# Patient Record
(demographics unavailable — no encounter records)

---

## 2024-11-07 NOTE — PHYSICAL EXAM
[Alert] : alert [No Acute Distress] : no acute distress [Normocephalic] : normocephalic [EOMI Bilateral] : EOMI bilateral [Clear tympanic membranes with bony landmarks and light reflex present bilaterally] : clear tympanic membranes with bony landmarks and light reflex present bilaterally  [Pink Nasal Mucosa] : pink nasal mucosa [Nonerythematous Oropharynx] : nonerythematous oropharynx [Supple, full passive range of motion] : supple, full passive range of motion [No Palpable Masses] : no palpable masses [Clear to Auscultation Bilaterally] : clear to auscultation bilaterally [Regular Rate and Rhythm] : regular rate and rhythm [Normal S1, S2 audible] : normal S1, S2 audible [No Murmurs] : no murmurs [+2 Femoral Pulses] : +2 femoral pulses [Soft] : soft [NonTender] : non tender [Non Distended] : non distended [Normoactive Bowel Sounds] : normoactive bowel sounds [No Hepatomegaly] : no hepatomegaly [No Splenomegaly] : no splenomegaly [Elier: ____] : Elier [unfilled] [No Masses] : no masses [Elier: _____] : Elier [unfilled] [Normal External Genitalia] : normal external genitalia [No Abnormal Lymph Nodes Palpated] : no abnormal lymph nodes palpated [Normal Muscle Tone] : normal muscle tone [No Gait Asymmetry] : no gait asymmetry [No pain or deformities with palpation of bone, muscles, joints] : no pain or deformities with palpation of bone, muscles, joints [Straight] : straight [+2 Patella DTR] : +2 patella DTR [Cranial Nerves Grossly Intact] : cranial nerves grossly intact [No Rash or Lesions] : no rash or lesions [de-identified] : short stature

## 2024-11-07 NOTE — HISTORY OF PRESENT ILLNESS
[Fruit] : fruit [Vegetables] : vegetables [Meat] : meat [Grains] : grains [Eggs] : eggs [Fish] : fish [Dairy] : dairy [Eats healthy meals and snacks] : eats healthy meals and snacks [___ stools every other day] : [unfilled]  stools every other day [Firm] : stools are firm consistency [___ voids per day] : [unfilled] voids per day [Normal] : Normal [In own bed] : In own bed [Brushing teeth twice/d] : brushing teeth twice per day [Flossing teeth] : flossing teeth [Playtime (60 min/d)] : playtime 60 min a day [Participates in after-school activities] : participates in after-school activities [< 2 hrs of screen time per day] : less than 2 hrs of screen time per day [Appropiate parent-child-sibling interaction] : appropriate parent-child-sibling interaction [Does chores when asked] : does chores when asked [Has Friends] : has friends [Has chance to make own decisions] : has chance to make own decisions [Grade ___] : Grade [unfilled] [Adequate social interactions] : adequate social interactions [Adequate behavior] : adequate behavior [Adequate performance] : adequate performance [Adequate attention] : adequate attention [No difficulties with Homework] : no difficulties with homework [Appropriately restrained in motor vehicle] : appropriately restrained in motor vehicle [Supervised outdoor play] : supervised outdoor play [Supervised around water] : supervised around water [Wears helmet and pads] : wears helmet and pads [Parent knows child's friends] : parent knows child's friends [Parent discusses safety rules regarding adults] : parent discusses safety rules regarding adults [Family discusses home emergency plan] : family discusses home emergency plan [Monitored computer use] : monitored computer use [Sleeps ___ hours per night] : sleeps [unfilled] hours per night [Father] : father [Yes] : Patient goes to dentist yearly [Toothpaste] : Primary Fluoride Source: Toothpaste [Up to date] : Up to date [Eats meals with family] : eats meals with family [Has family members/adults to turn to for help] : has family members/adults to turn to for help [Is permitted and is able to make independent decisions] : Is permitted and is able to make independent decisions [Sleep Concerns] : sleep concerns [Grade: ____] : Grade: [unfilled] [Normal Performance] : normal performance [Normal Behavior/Attention] : normal behavior/attention [Normal Homework] : normal homework [Eats regular meals including adequate fruits and vegetables] : eats regular meals including adequate fruits and vegetables [Drinks non-sweetened liquids] : drinks non-sweetened liquids  [Calcium source] : calcium source [Has friends] : has friends [At least 1 hour of physical activity a day] : at least 1 hour of physical activity a day [Screen time (except homework) less than 2 hours a day] : screen time (except homework) less than 2 hours a day [Has interests/participates in community activities/volunteers] : has interests/participates in community activities/volunteers. [Uses safety belts/safety equipment] : uses safety belts/safety equipment  [Has peer relationships free of violence] : has peer relationships free of violence [No] : Patient has not had sexual intercourse [HIV Screening Declined] : HIV Screening Declined [Has ways to cope with stress] : has ways to cope with stress [Displays self-confidence] : displays self-confidence [Has problems with sleep] : has problems with sleep [Gets depressed, anxious, or irritable/has mood swings] : gets depressed, anxious, or irritable/has mood swings [With Teen] : teen [With Parent/Guardian] : parent/guardian [NO] : No [Exposure to illicit drugs] : no exposure to illicit drugs [de-identified] : eating better this year as per dad, having 3 meals per day  [FreeTextEntry3] : has a difficult time falling asleep, giving 1/2 tablet of 27mg concerta daily which has helped her to go to sleep, had a break during the summer  [Has concerns about body or appearance] : does not have concerns about body or appearance [Uses electronic nicotine delivery system] : does not use electronic nicotine delivery system [Exposure to electronic nicotine delivery system] : no exposure to electronic nicotine delivery system [Uses tobacco] : does not use tobacco [Exposure to tobacco] : no exposure to tobacco [Uses drugs] : does not use drugs  [Exposure to drugs] : no exposure to drugs [Drinks alcohol] : does not drink alcohol [Exposure to alcohol] : no exposure to alcohol [Impaired/distracted driving] : no impaired/distracted driving [Has thought about hurting self or considered suicide] : has not thought about hurting self or considered suicide [FreeTextEntry7] : no recent hospitalizations, ed visits  [de-identified] : none [de-identified] : adequate attention/focus, no homework difficulty  [de-identified] : eating fruits, vegetables, meats, grains. eggs, fish, cheese - has a para that helps with redirecting and her para has reported better eating habits this school year - dad states she has atleast 3 meals per day and they are balanced. Taking daily mulivitamin [FreeTextEntry1] :   Last year there was stress at home with grandparents being sick however they have since improved.

## 2024-11-07 NOTE — PHYSICAL EXAM
[Alert] : alert [No Acute Distress] : no acute distress [Normocephalic] : normocephalic [EOMI Bilateral] : EOMI bilateral [Clear tympanic membranes with bony landmarks and light reflex present bilaterally] : clear tympanic membranes with bony landmarks and light reflex present bilaterally  [Pink Nasal Mucosa] : pink nasal mucosa [Nonerythematous Oropharynx] : nonerythematous oropharynx [Supple, full passive range of motion] : supple, full passive range of motion [No Palpable Masses] : no palpable masses [Clear to Auscultation Bilaterally] : clear to auscultation bilaterally [Regular Rate and Rhythm] : regular rate and rhythm [Normal S1, S2 audible] : normal S1, S2 audible [No Murmurs] : no murmurs [+2 Femoral Pulses] : +2 femoral pulses [Soft] : soft [NonTender] : non tender [Non Distended] : non distended [Normoactive Bowel Sounds] : normoactive bowel sounds [No Hepatomegaly] : no hepatomegaly [No Splenomegaly] : no splenomegaly [Elier: ____] : Elier [unfilled] [No Masses] : no masses [Elier: _____] : Elier [unfilled] [Normal External Genitalia] : normal external genitalia [No Abnormal Lymph Nodes Palpated] : no abnormal lymph nodes palpated [Normal Muscle Tone] : normal muscle tone [No Gait Asymmetry] : no gait asymmetry [No pain or deformities with palpation of bone, muscles, joints] : no pain or deformities with palpation of bone, muscles, joints [Straight] : straight [+2 Patella DTR] : +2 patella DTR [Cranial Nerves Grossly Intact] : cranial nerves grossly intact [No Rash or Lesions] : no rash or lesions [de-identified] : short stature

## 2024-11-07 NOTE — PHYSICAL EXAM
[Alert] : alert [No Acute Distress] : no acute distress [Normocephalic] : normocephalic [EOMI Bilateral] : EOMI bilateral [Clear tympanic membranes with bony landmarks and light reflex present bilaterally] : clear tympanic membranes with bony landmarks and light reflex present bilaterally  [Pink Nasal Mucosa] : pink nasal mucosa [Nonerythematous Oropharynx] : nonerythematous oropharynx [Supple, full passive range of motion] : supple, full passive range of motion [No Palpable Masses] : no palpable masses [Clear to Auscultation Bilaterally] : clear to auscultation bilaterally [Regular Rate and Rhythm] : regular rate and rhythm [Normal S1, S2 audible] : normal S1, S2 audible [No Murmurs] : no murmurs [+2 Femoral Pulses] : +2 femoral pulses [Soft] : soft [NonTender] : non tender [Non Distended] : non distended [Normoactive Bowel Sounds] : normoactive bowel sounds [No Hepatomegaly] : no hepatomegaly [No Splenomegaly] : no splenomegaly [Elier: ____] : Elier [unfilled] [No Masses] : no masses [Elier: _____] : Elier [unfilled] [Normal External Genitalia] : normal external genitalia [No Abnormal Lymph Nodes Palpated] : no abnormal lymph nodes palpated [Normal Muscle Tone] : normal muscle tone [No Gait Asymmetry] : no gait asymmetry [No pain or deformities with palpation of bone, muscles, joints] : no pain or deformities with palpation of bone, muscles, joints [Straight] : straight [+2 Patella DTR] : +2 patella DTR [Cranial Nerves Grossly Intact] : cranial nerves grossly intact [No Rash or Lesions] : no rash or lesions [de-identified] : short stature

## 2024-11-07 NOTE — DISCUSSION/SUMMARY
[Normal Development] : development  [No Elimination Concerns] : elimination [Continue Regimen] : feeding [No Skin Concerns] : skin [Normal Sleep Pattern] : sleep [Poor Weight Gain] : poor weight gain [Poor Height Growth] : poor height growth [None] : no medical problems [Anticipatory Guidance Given] : Anticipatory guidance addressed as per the history of present illness section [Physical Growth and Development] : physical growth and development [Social and Academic Competence] : social and academic competence [Emotional Well-Being] : emotional well-being [Risk Reduction] : risk reduction [Violence and Injury Prevention] : violence and injury prevention [No Medications] : ~He/She~ is not on any medications [Patient] : patient [Parent/Guardian] : Parent/Guardian [Full Activity without restrictions including Physical Education & Athletics] : Full Activity without restrictions including Physical Education & Athletics [I have examined the above-named student and completed the preparticipation physical evaluation. The athlete does not present apparent clinical contraindications to practice and participate in sport(s) as outlined above. A copy of the physical exam is on r] : I have examined the above-named student and completed the preparticipation physical evaluation. The athlete does not present apparent clinical contraindications to practice and participate in sport(s) as outlined above. A copy of the physical exam is on record in my office and can be made available to the school at the request of the parents. If conditions arise after the athlete has been cleared for participation, the physician may rescind the clearance until the problem is resolved and the potential consequences are completely explained to the athlete (and parents/guardians). [] : The components of the vaccine(s) to be administered today are listed in the plan of care. The disease(s) for which the vaccine(s) are intended to prevent and the risks have been discussed with the caretaker.  The risks are also included in the appropriate vaccination information statements which have been provided to the patient's caregiver.  The caregiver has given consent to vaccinate. [FreeTextEntry1] :  Passed hearing and vision screen. Poor height and weight growth, rani stage at 1 - given endo referral.  ADHD managed by Dr. Izaguirre, currently taking 1/2 of the 27mg tablet of concerta daily - since giving half of the daily dose Mikayla's sleep has improved. Father to schedule follow up with Dr. Izaguirre. PHQ 9 and PARTHA 7 - Mild Anxiety and Depression. CRAFFT 0 Continue balanced diet with all food groups. Brush teeth twice a day with toothbrush. Recommend visit to dentist. Maintain consistent daily routines and sleep schedule. Personal hygiene, puberty, and sexual health reviewed. Risky behaviors assessed. School discussed. Limit screen time to no more than 2 hours per day. Encourage physical activity. Tdap, Menquadfi, HPV given today. Father states will get flu next week. Given lab requisition for cbc and lipid screen. RTC in 3 months for weight check or sooner as needed.  Return 1 year for routine well child check.

## 2024-11-07 NOTE — HISTORY OF PRESENT ILLNESS
[Fruit] : fruit [Vegetables] : vegetables [Meat] : meat [Grains] : grains [Eggs] : eggs [Fish] : fish [Dairy] : dairy [Eats healthy meals and snacks] : eats healthy meals and snacks [___ stools every other day] : [unfilled]  stools every other day [Firm] : stools are firm consistency [___ voids per day] : [unfilled] voids per day [Normal] : Normal [In own bed] : In own bed [Brushing teeth twice/d] : brushing teeth twice per day [Flossing teeth] : flossing teeth [Playtime (60 min/d)] : playtime 60 min a day [Participates in after-school activities] : participates in after-school activities [< 2 hrs of screen time per day] : less than 2 hrs of screen time per day [Appropiate parent-child-sibling interaction] : appropriate parent-child-sibling interaction [Does chores when asked] : does chores when asked [Has Friends] : has friends [Has chance to make own decisions] : has chance to make own decisions [Grade ___] : Grade [unfilled] [Adequate social interactions] : adequate social interactions [Adequate behavior] : adequate behavior [Adequate performance] : adequate performance [Adequate attention] : adequate attention [No difficulties with Homework] : no difficulties with homework [Appropriately restrained in motor vehicle] : appropriately restrained in motor vehicle [Supervised outdoor play] : supervised outdoor play [Supervised around water] : supervised around water [Wears helmet and pads] : wears helmet and pads [Parent knows child's friends] : parent knows child's friends [Parent discusses safety rules regarding adults] : parent discusses safety rules regarding adults [Family discusses home emergency plan] : family discusses home emergency plan [Monitored computer use] : monitored computer use [Sleeps ___ hours per night] : sleeps [unfilled] hours per night [Father] : father [Yes] : Patient goes to dentist yearly [Toothpaste] : Primary Fluoride Source: Toothpaste [Up to date] : Up to date [Eats meals with family] : eats meals with family [Has family members/adults to turn to for help] : has family members/adults to turn to for help [Is permitted and is able to make independent decisions] : Is permitted and is able to make independent decisions [Sleep Concerns] : sleep concerns [Grade: ____] : Grade: [unfilled] [Normal Performance] : normal performance [Normal Behavior/Attention] : normal behavior/attention [Normal Homework] : normal homework [Eats regular meals including adequate fruits and vegetables] : eats regular meals including adequate fruits and vegetables [Drinks non-sweetened liquids] : drinks non-sweetened liquids  [Calcium source] : calcium source [Has friends] : has friends [At least 1 hour of physical activity a day] : at least 1 hour of physical activity a day [Screen time (except homework) less than 2 hours a day] : screen time (except homework) less than 2 hours a day [Has interests/participates in community activities/volunteers] : has interests/participates in community activities/volunteers. [Uses safety belts/safety equipment] : uses safety belts/safety equipment  [Has peer relationships free of violence] : has peer relationships free of violence [No] : Patient has not had sexual intercourse [HIV Screening Declined] : HIV Screening Declined [Has ways to cope with stress] : has ways to cope with stress [Displays self-confidence] : displays self-confidence [Has problems with sleep] : has problems with sleep [Gets depressed, anxious, or irritable/has mood swings] : gets depressed, anxious, or irritable/has mood swings [With Teen] : teen [With Parent/Guardian] : parent/guardian [NO] : No [Exposure to illicit drugs] : no exposure to illicit drugs [de-identified] : eating better this year as per dad, having 3 meals per day  [FreeTextEntry3] : has a difficult time falling asleep, giving 1/2 tablet of 27mg concerta daily which has helped her to go to sleep, had a break during the summer  [Has concerns about body or appearance] : does not have concerns about body or appearance [Uses electronic nicotine delivery system] : does not use electronic nicotine delivery system [Exposure to electronic nicotine delivery system] : no exposure to electronic nicotine delivery system [Uses tobacco] : does not use tobacco [Exposure to tobacco] : no exposure to tobacco [Uses drugs] : does not use drugs  [Exposure to drugs] : no exposure to drugs [Drinks alcohol] : does not drink alcohol [Exposure to alcohol] : no exposure to alcohol [Impaired/distracted driving] : no impaired/distracted driving [Has thought about hurting self or considered suicide] : has not thought about hurting self or considered suicide [FreeTextEntry7] : no recent hospitalizations, ed visits  [de-identified] : none [de-identified] : adequate attention/focus, no homework difficulty  [de-identified] : eating fruits, vegetables, meats, grains. eggs, fish, cheese - has a para that helps with redirecting and her para has reported better eating habits this school year - dad states she has atleast 3 meals per day and they are balanced. Taking daily mulivitamin [FreeTextEntry1] :   Last year there was stress at home with grandparents being sick however they have since improved.

## 2024-11-07 NOTE — HISTORY OF PRESENT ILLNESS
[Fruit] : fruit [Vegetables] : vegetables [Meat] : meat [Grains] : grains [Eggs] : eggs [Fish] : fish [Dairy] : dairy [Eats healthy meals and snacks] : eats healthy meals and snacks [___ stools every other day] : [unfilled]  stools every other day [Firm] : stools are firm consistency [___ voids per day] : [unfilled] voids per day [Normal] : Normal [In own bed] : In own bed [Brushing teeth twice/d] : brushing teeth twice per day [Flossing teeth] : flossing teeth [Playtime (60 min/d)] : playtime 60 min a day [Participates in after-school activities] : participates in after-school activities [< 2 hrs of screen time per day] : less than 2 hrs of screen time per day [Appropiate parent-child-sibling interaction] : appropriate parent-child-sibling interaction [Does chores when asked] : does chores when asked [Has Friends] : has friends [Has chance to make own decisions] : has chance to make own decisions [Grade ___] : Grade [unfilled] [Adequate social interactions] : adequate social interactions [Adequate behavior] : adequate behavior [Adequate performance] : adequate performance [Adequate attention] : adequate attention [No difficulties with Homework] : no difficulties with homework [Appropriately restrained in motor vehicle] : appropriately restrained in motor vehicle [Supervised outdoor play] : supervised outdoor play [Supervised around water] : supervised around water [Wears helmet and pads] : wears helmet and pads [Parent knows child's friends] : parent knows child's friends [Parent discusses safety rules regarding adults] : parent discusses safety rules regarding adults [Family discusses home emergency plan] : family discusses home emergency plan [Monitored computer use] : monitored computer use [Sleeps ___ hours per night] : sleeps [unfilled] hours per night [Father] : father [Yes] : Patient goes to dentist yearly [Toothpaste] : Primary Fluoride Source: Toothpaste [Up to date] : Up to date [Eats meals with family] : eats meals with family [Has family members/adults to turn to for help] : has family members/adults to turn to for help [Is permitted and is able to make independent decisions] : Is permitted and is able to make independent decisions [Sleep Concerns] : sleep concerns [Grade: ____] : Grade: [unfilled] [Normal Performance] : normal performance [Normal Behavior/Attention] : normal behavior/attention [Normal Homework] : normal homework [Eats regular meals including adequate fruits and vegetables] : eats regular meals including adequate fruits and vegetables [Drinks non-sweetened liquids] : drinks non-sweetened liquids  [Calcium source] : calcium source [Has friends] : has friends [At least 1 hour of physical activity a day] : at least 1 hour of physical activity a day [Screen time (except homework) less than 2 hours a day] : screen time (except homework) less than 2 hours a day [Has interests/participates in community activities/volunteers] : has interests/participates in community activities/volunteers. [Uses safety belts/safety equipment] : uses safety belts/safety equipment  [Has peer relationships free of violence] : has peer relationships free of violence [No] : Patient has not had sexual intercourse [HIV Screening Declined] : HIV Screening Declined [Has ways to cope with stress] : has ways to cope with stress [Displays self-confidence] : displays self-confidence [Has problems with sleep] : has problems with sleep [Gets depressed, anxious, or irritable/has mood swings] : gets depressed, anxious, or irritable/has mood swings [With Teen] : teen [With Parent/Guardian] : parent/guardian [NO] : No [Exposure to illicit drugs] : no exposure to illicit drugs [de-identified] : eating better this year as per dad, having 3 meals per day  [FreeTextEntry3] : has a difficult time falling asleep, giving 1/2 tablet of 27mg concerta daily which has helped her to go to sleep, had a break during the summer  [Has concerns about body or appearance] : does not have concerns about body or appearance [Uses electronic nicotine delivery system] : does not use electronic nicotine delivery system [Exposure to electronic nicotine delivery system] : no exposure to electronic nicotine delivery system [Uses tobacco] : does not use tobacco [Exposure to tobacco] : no exposure to tobacco [Uses drugs] : does not use drugs  [Exposure to drugs] : no exposure to drugs [Drinks alcohol] : does not drink alcohol [Exposure to alcohol] : no exposure to alcohol [Impaired/distracted driving] : no impaired/distracted driving [Has thought about hurting self or considered suicide] : has not thought about hurting self or considered suicide [FreeTextEntry7] : no recent hospitalizations, ed visits  [de-identified] : none [de-identified] : adequate attention/focus, no homework difficulty  [de-identified] : eating fruits, vegetables, meats, grains. eggs, fish, cheese - has a para that helps with redirecting and her para has reported better eating habits this school year - dad states she has atleast 3 meals per day and they are balanced. Taking daily mulivitamin [FreeTextEntry1] :   Last year there was stress at home with grandparents being sick however they have since improved.

## 2025-02-06 NOTE — DISCUSSION/SUMMARY
[FreeTextEntry1] : 10 yo female with hx of ADHD p/f weight check. Patient gained 2 lb since November.  Recommend increasing caloric intake with protein powder in smoothies, carnation instant breakfast powder in milk, and more snacks throughout the day. Referred to Nutrition. follow up with endocrinology on 02/14/2025.   RTC in 6 months for weight check.  79

## 2025-02-06 NOTE — HISTORY OF PRESENT ILLNESS
[FreeTextEntry6] : 12yo female here for weight check follow up.   Patient last seen November 6th 2024 due to poor weight/height gain. Given endo referral, appointment is 02/14.  Patient gained 2lb2oz since November 2024, still in 1%. No changes in diet per parents, patient always finishes her meals and portions are close to father's first portion. However it takes patient 1-2 hours to finish eating because of distractions. Pt eats beans, rice, chicken/pork, pancakes/waffles/cereal/egg sandwiches, avocadoes. She eats school lunch. she has milk with her cereal. Father mentions that patient is very gassy and burps a lot. Per father this is all the time, unsure if this is after certain foods. No rashes, no HA associated with foods. No gross blood, no mucus or sticky substance around stool. No rashes, no vision changes, no abd pain, no pruritus after certain foods.  Pt is mildly active; she walks a few times a week and has art class. no other sport extra curriculars.   Pt does not have heat/cold intolerance, brittle nails. No fhx of thryoidism.   Pt on concerta during the school year (6.25mg). She was off of it this summer and parents did not see a change in mood /appetite.

## 2025-02-06 NOTE — PHYSICAL EXAM
[Elier: ____] : Elier [unfilled] [Normal external genitalia] : normal external genitalia [NL] : warm, clear [Warm] : warm [Clear] : clear

## 2025-02-19 NOTE — HISTORY OF PRESENT ILLNESS
[FreeTextEntry2] : Mikayla is a 11 year 8 month old girl, here with her father, for concerns of growth and weight gain.  Review of growth data shows linear growth between the 10th-25th percentile until 10 years and then slow down to 5th percentile; her weight curve shows poor weight gain since 9 years, with BMI and weight well below 5th percentile..  Mikayla has a lot of gas.  Mikalya does not have abdominal pain.   She has normal bowel movements.    She has been on ADHD medication for 1-1.5 years.  She takes Concerta 27 mg 1/2 tab daily.  Due to concerns about how this may be affecting growth, her neurologist is considering stopping Concerta.      Mikayla has a poor appetite and this has been longstanding.  When she was off Concerta over the summer, her appetite was the same.   Mikayla eats Breakfast:   waffles (1-2) silver dollar pancakes (at least 4) or avacado toast (1 slice), usually drinks milk with carnation good start, sugar, or cream Lunch:  school lunch and reports eating most of lunch, usually does not drink milk with lunch Snack:  Yogurt, drink, munchkins, egg nog Dinner:  rice, bean, chicken, pork, pasta (COMPLETES WHAT IS GIVEN TO HER) Mikayla is a slow eater, she is not interested in eating, but completes her meal.   There are no changes with puberty, yet.

## 2025-02-19 NOTE — HISTORY OF PRESENT ILLNESS
[FreeTextEntry2] : Mikayla is a 11 year 8 month old girl, here with her father, for concerns of growth and weight gain.  Review of growth data shows linear growth between the 10th-25th percentile until 10 years and then slow down to 5th percentile; her weight curve shows poor weight gain since 9 years, with BMI and weight well below 5th percentile..  Mikayla has a lot of gas.  Mikayla does not have abdominal pain.   She has normal bowel movements.    She has been on ADHD medication for 1-1.5 years.  She takes Concerta 27 mg 1/2 tab daily.  Due to concerns about how this may be affecting growth, her neurologist is considering stopping Concerta.      Mikayla has a poor appetite and this has been longstanding.  When she was off Concerta over the summer, her appetite was the same.   Mikayla eats Breakfast:   waffles (1-2) silver dollar pancakes (at least 4) or avacado toast (1 slice), usually drinks milk with carnation good start, sugar, or cream Lunch:  school lunch and reports eating most of lunch, usually does not drink milk with lunch Snack:  Yogurt, drink, munchkins, egg nog Dinner:  rice, bean, chicken, pork, pasta (COMPLETES WHAT IS GIVEN TO HER) Mikayla is a slow eater, she is not interested in eating, but completes her meal.   There are no changes with puberty, yet.

## 2025-02-19 NOTE — PHYSICAL EXAM
[Healthy Appearing] : healthy appearing [Well Nourished] : well nourished [Interactive] : interactive [Well formed] : well formed [Normally Set] : normally set [Normal S1 and S2] : normal S1 and S2 [Murmur] : no murmurs [Clear to Ausculation Bilaterally] : clear to auscultation bilaterally [Abdomen Soft] : soft [Abdomen Tenderness] : non-tender [] : no hepatosplenomegaly [1] : was Elier stage 1 [Normal Appearance] : normal in appearance [Elier Stage ___] : the Elier stage for breast development was [unfilled] [Normal] : normal

## 2025-02-19 NOTE — CONSULT LETTER
[Dear  ___] : Dear  [unfilled], [Consult Letter:] : I had the pleasure of evaluating your patient, [unfilled]. [Please see my note below.] : Please see my note below. [Sincerely,] : Sincerely, [FreeTextEntry2] : Jennifer Costello MD [FreeTextEntry3] : Jonna Resendez MD

## 2025-02-19 NOTE — ASSESSMENT
[FreeTextEntry1] : Naseem is a 11 year 8 month girl with growth deceleration and poor weight gain whose BMI is in underweight category on stimulant medication.  She is not yet in puberty.  I discussed with NASEEM and her  mother the important factors necessary for normal growth.   To assess for causes of poor growth and short stature, I have ordered the following:  CBC, CMP, TSH, free T4, IGF1/BP3 CRP, and tissue/transglutaminase.  To assess for skeletal maturity, a bone age was ordered.  Though Naseem is on stimulant medication, by diet history, it seems her intake is sufficient and doesn't explain degree of weight loss.  I, therefore, recommend that Naseem is also evaluated by GI.   Contact information provided to family.  Follow up with me in 4 months, interim evaluation/treatment pending above.

## 2025-03-12 NOTE — ASSESSMENT
[FreeTextEntry1] : Mikayla's gassiness and poor appetite may be related to inadequate bowel movements. She has been already evaluated for other differential causes including celiac disease, thyroid disease and inflammatory disease. Recommendations: - start Exlax 2 squares daily to achieve daily large soft bowel movement, explained to mom how to titrate dose to achieve this goal - In order to safely and effectively implement these recommendations, I asked for the patient to follow up with PA/NP in about 1-2 months, to optimize care as needed, including medications, labs, imaging, procedures depending on clinical status.  - depending on response, will plan to wean laxative and introduce supplementary fiber to maintain healthier bowel regimen

## 2025-03-12 NOTE — HISTORY OF PRESENT ILLNESS
[de-identified] :  referred by pediatrician for GI evaluation  has been gassy for last 5 years, eructation and flatus removed dairy without improvement slowed weight gain over last 2 years seen by endocrinology, had normal tests sent including; CBC, CMP, CRP, TFT, TTG IgA no history of abdominal pain, nausea, vomiting, diarrhea, fevers typical  diet; rice, meat, beans, pasta, sweet plaintains have been supplementing with KF boxes, 1-2 per day over last 2-3 weeks, has gained 15 g/ day for the last 3 weeks, which is above average for age (avg 5-10g/day) have shortened meal duration to under an hour, instead of 2 hrs sometimes feels full quickly, sometimes does not know she feels hungry, eats mostly on schedule was on adhd meds for the last 18 months, only stopped recently due to concerns for short stature BM almost daily, bristol 4, large caliber, sometimes bristol 1, no blood, does not feel like she fully evacuates no family history of celiac disease, IBD, Crohn disease, Ulcerative Colitis  family history of gallbladder disease

## 2025-03-12 NOTE — HISTORY OF PRESENT ILLNESS
[de-identified] :  referred by pediatrician for GI evaluation  has been gassy for last 5 years, eructation and flatus removed dairy without improvement slowed weight gain over last 2 years seen by endocrinology, had normal tests sent including; CBC, CMP, CRP, TFT, TTG IgA no history of abdominal pain, nausea, vomiting, diarrhea, fevers typical  diet; rice, meat, beans, pasta, sweet plaintains have been supplementing with KF boxes, 1-2 per day over last 2-3 weeks, has gained 15 g/ day for the last 3 weeks, which is above average for age (avg 5-10g/day) have shortened meal duration to under an hour, instead of 2 hrs sometimes feels full quickly, sometimes does not know she feels hungry, eats mostly on schedule was on adhd meds for the last 18 months, only stopped recently due to concerns for short stature BM almost daily, bristol 4, large caliber, sometimes bristol 1, no blood, does not feel like she fully evacuates no family history of celiac disease, IBD, Crohn disease, Ulcerative Colitis  family history of gallbladder disease

## 2025-03-12 NOTE — CONSULT LETTER
[Dear  ___] : Dear  [unfilled], [Consult Letter:] : I had the pleasure of evaluating your patient, [unfilled]. [Please see my note below.] : Please see my note below. [Consult Closing:] : Thank you very much for allowing me to participate in the care of this patient.  If you have any questions, please do not hesitate to contact me. [Sincerely,] : Sincerely, [FreeTextEntry3] : Janes Dwyer MD MSc  Director, Pediatric Endoscopy Pediatric Gastroenterology and Nutrition [employer] [facility] and Varsha Miller [facility]  Division of Pediatric Gastroenterology and Nutrition  [address], [address]  [place] [address]  [phone]

## 2025-03-12 NOTE — CONSULT LETTER
[Dear  ___] : Dear  [unfilled], [Consult Letter:] : I had the pleasure of evaluating your patient, [unfilled]. [Please see my note below.] : Please see my note below. [Consult Closing:] : Thank you very much for allowing me to participate in the care of this patient.  If you have any questions, please do not hesitate to contact me. [Sincerely,] : Sincerely, [FreeTextEntry3] : Janes Dwyer MD MSc  Director, Pediatric Endoscopy Pediatric Gastroenterology and Nutrition [facility] [employer] at [facility] and Varsha Miller [facility]  Division of Pediatric Gastroenterology and Nutrition  [address], [address]  [place] [address]  [phone]

## 2025-03-19 NOTE — BEGINNING OF VISIT
[Parents] : parents [Home] : at home, [unfilled] , at the time of the visit. [Medical Office: ([facility])___] : at the medical office located in  [Telehealth (audio & video)] : This visit was provided via telehealth using real-time 2-way audio visual technology.

## 2025-03-19 NOTE — HISTORY OF PRESENT ILLNESS
[FreeTextEntry6] : Growth - s/p work up with endo and GI constipation noted, being treated. remainder of w/u unremarkable. eating, and parents thinking gaining weight sees nutritionist.  taking KF supplements still with low desire to eat  ADHD - academically doing ok without stimulants testing well but noted inattentive and distractable behaviors has para in school which has been helpful to keep Mikayla on task few teacher complaints. at home many behavioral issues related to distractibility. [de-identified] : ADHD

## 2025-03-19 NOTE — DISCUSSION/SUMMARY
[FreeTextEntry1] : Poor weight gain ADHD  Noted academically doing ok without stimulants. With significant growth issues and poor appetite inclined to defer resumption of stimulants at this time. Advised to f/u as arranged with nutrition, GI, endo. Will follow growth curve. As long as academics remain good, will rediscuss resumption of meds over the summer in anticipation of the next academic year.

## 2025-04-28 NOTE — REASON FOR VISIT
[Father] : father [Consultation] : a consultation visit [Consultation Follow Up] : a consultation follow up

## 2025-04-30 NOTE — CONSULT LETTER
[Dear  ___] : Dear  [unfilled], [Consult Letter:] : I had the pleasure of evaluating your patient, [unfilled]. [Please see my note below.] : Please see my note below. [Consult Closing:] : Thank you very much for allowing me to participate in the care of this patient.  If you have any questions, please do not hesitate to contact me. [Sincerely,] : Sincerely, [FreeTextEntry3] : Bella Rose [facility] Pediatric Gastroenterology, Liver Disease and Nutrition Hallie Miller [facility] [phone]

## 2025-04-30 NOTE — CONSULT LETTER
[Dear  ___] : Dear  [unfilled], [Consult Letter:] : I had the pleasure of evaluating your patient, [unfilled]. [Please see my note below.] : Please see my note below. [Consult Closing:] : Thank you very much for allowing me to participate in the care of this patient.  If you have any questions, please do not hesitate to contact me. [Sincerely,] : Sincerely, [FreeTextEntry3] : Bella Rose [facility] Pediatric Gastroenterology, Liver Disease and Nutrition Hallie Miller [facility] [phone]

## 2025-04-30 NOTE — PHYSICAL EXAM
[Well Developed] : well developed [NAD] : in no acute distress [PERRL] : pupils were equal, round, reactive to light  [Moist & Pink Mucous Membranes] : moist and pink mucous membranes [CTAB] : lungs clear to auscultation bilaterally [Regular Rate and Rhythm] : regular rate and rhythm [Normal S1, S2] : normal S1 and S2 [Soft] : soft  [Normal Bowel Sounds] : normal bowel sounds [Stool Palpable] : stool palpable [No HSM] : no hepatosplenomegaly appreciated [Normal Tone] : normal tone [Well-Perfused] : well-perfused [Interactive] : interactive [Rectal Exam Deferred] : rectal exam was deferred [icteric] : anicteric [Respiratory Distress] : no respiratory distress  [Distended] : non distended [Tender] : non tender [Edema] : no edema [Cyanosis] : no cyanosis [Rash] : no rash [Jaundice] : no jaundice [FreeTextEntry1] : Chatty pre adolescent

## 2025-04-30 NOTE — HISTORY OF PRESENT ILLNESS
[de-identified] : 11 year old female with ADHD  on Concerta for past ~1.5 years (recently disconotnued) with history of gassiness, frequent belching and poor appetite here for follow up visit.  Endocrinology evaluation in February 2025 for plateauing of linear growth prior to initial visit. CBC, CMP, CRP, TFT, TTG IgA unremarkable. Recommended follow up in 4 months after GI evaluation.   Mikayla was initially seen by Dr. Dwyer in March 2025.  Gassiness, eructation and poor weight gain may be related to inadequate bowel movements. She has been already evaluated for other differential causes including celiac disease, thyroid disease and inflammatory disease. Recommended Exlax 2 squares daily to achieve daily large soft bowel movement.  Mikayla comes in for follow up visit. She is taking Exlax 3 8.6 mg. tabs daily. BM's QD-BID, #4-5 on the [place] scale. There is no straining. Flatus is less frequent. She does continue with frequent belching. It is everyday, on and off throughout the day.  Mostly random in timing. No reflux, no dysphagia.  Appetite has been better.  She has been taking KF 1-2 cans a day. Diet has a decent variety. She has gained great weight since her last visit. There are no c/o abdominal pain.

## 2025-04-30 NOTE — ASSESSMENT
[Educated Patient & Family about Diagnosis] : educated the patient and family about the diagnosis [FreeTextEntry1] : 11 year old female  with ADHD with history of gassiness, eructation and poor appetite.  February 2025 CBC, CMP, CRP, celiac and thyroid screening unremarkable. Symptoms improved with BM regularity and great weight gain on caloric supplementation, however eructation persists. Discussed treating with empiric trial of H2 blocker.  PLAN: -Continue Exlax 2 squares daily   -Pepcid 10 mg. PO BID -follow up office visit in 4-6 weeks- if symptoms persist or worsen mom to call sooner

## 2025-04-30 NOTE — HISTORY OF PRESENT ILLNESS
[de-identified] : 11 year old female with ADHD  on Concerta for past ~1.5 years (recently disconotnued) with history of gassiness, frequent belching and poor appetite here for follow up visit.  Endocrinology evaluation in February 2025 for plateauing of linear growth prior to initial visit. CBC, CMP, CRP, TFT, TTG IgA unremarkable. Recommended follow up in 4 months after GI evaluation.   Mikayla was initially seen by Dr. Dwyer in March 2025.  Gassiness, eructation and poor weight gain may be related to inadequate bowel movements. She has been already evaluated for other differential causes including celiac disease, thyroid disease and inflammatory disease. Recommended Exlax 2 squares daily to achieve daily large soft bowel movement.  Mikayla comes in for follow up visit. She is taking Exlax 3 8.6 mg. tabs daily. BM's QD-BID, #4-5 on the [place] scale. There is no straining. Flatus is less frequent. She does continue with frequent belching. It is everyday, on and off throughout the day.  Mostly random in timing. No reflux, no dysphagia.  Appetite has been better.  She has been taking KF 1-2 cans a day. Diet has a decent variety. She has gained great weight since her last visit. There are no c/o abdominal pain.

## 2025-05-08 NOTE — HISTORY OF PRESENT ILLNESS
[de-identified] : ADHD [FreeTextEntry6] : continued behavioral issues at school needs reminders often to keep her on task has para to assist parents with similar issues at home; needing constant reminders to keep Mikayla on task IEP in place with 504 accommodations despite above, and with great effort, Mikayla seems to be academically doing ok. follows with GI re constipation and weight gain issues.  Doing a little better.

## 2025-05-08 NOTE — BEGINNING OF VISIT
[Father] : father [Home] : at home, [unfilled] , at the time of the visit. [place] [place] : at the medical office located in  [Telehealth (audio & video)] : This visit was provided via telehealth using real-time 2-way audio visual technology. [Verbal consent obtained from patient] : the patient, [unfilled]

## 2025-05-08 NOTE — DISCUSSION/SUMMARY
[FreeTextEntry1] : ADHD struggles with attentional issues IEP, 504 accommodations, para in place despite above academically seems to be doing ok, with extreme effort  in light of weight gain issues, acceptable academic performance and nearing the end of the academic year - will defer resumption of stimulant medications at this time. f/u August 2025 for discussion regarding plan for the fall academic year.